# Patient Record
Sex: FEMALE | Race: AMERICAN INDIAN OR ALASKA NATIVE
[De-identification: names, ages, dates, MRNs, and addresses within clinical notes are randomized per-mention and may not be internally consistent; named-entity substitution may affect disease eponyms.]

---

## 2022-06-05 ENCOUNTER — HOSPITAL ENCOUNTER (EMERGENCY)
Dept: HOSPITAL 5 - ED | Age: 47
Discharge: HOME | End: 2022-06-05
Payer: COMMERCIAL

## 2022-06-05 VITALS — DIASTOLIC BLOOD PRESSURE: 94 MMHG | SYSTOLIC BLOOD PRESSURE: 148 MMHG

## 2022-06-05 DIAGNOSIS — Z98.890: ICD-10-CM

## 2022-06-05 DIAGNOSIS — M06.9: Primary | ICD-10-CM

## 2022-06-05 DIAGNOSIS — I10: ICD-10-CM

## 2022-06-05 LAB
ALBUMIN SERPL-MCNC: 4.4 G/DL (ref 3.9–5)
ALT SERPL-CCNC: 8 UNITS/L (ref 7–56)
BASOPHILS # (AUTO): 0 K/MM3 (ref 0–0.1)
BASOPHILS NFR BLD AUTO: 0.3 % (ref 0–1.8)
BUN SERPL-MCNC: 12 MG/DL (ref 7–17)
BUN/CREAT SERPL: 17 %
CALCIUM SERPL-MCNC: 9.3 MG/DL (ref 8.4–10.2)
EOSINOPHIL # BLD AUTO: 0.1 K/MM3 (ref 0–0.4)
EOSINOPHIL NFR BLD AUTO: 0.9 % (ref 0–4.3)
HCT VFR BLD CALC: 37.4 % (ref 30.3–42.9)
HEMOLYSIS INDEX: 5
HGB BLD-MCNC: 13.1 GM/DL (ref 10.1–14.3)
LYMPHOCYTES # BLD AUTO: 1.9 K/MM3 (ref 1.2–5.4)
LYMPHOCYTES NFR BLD AUTO: 18.8 % (ref 13.4–35)
MCHC RBC AUTO-ENTMCNC: 35 % (ref 30–34)
MCV RBC AUTO: 96 FL (ref 79–97)
MONOCYTES # (AUTO): 0.5 K/MM3 (ref 0–0.8)
MONOCYTES % (AUTO): 5.1 % (ref 0–7.3)
PLATELET # BLD: 347 K/MM3 (ref 140–440)
RBC # BLD AUTO: 3.89 M/MM3 (ref 3.65–5.03)

## 2022-06-05 PROCEDURE — 85025 COMPLETE CBC W/AUTO DIFF WBC: CPT

## 2022-06-05 PROCEDURE — 96361 HYDRATE IV INFUSION ADD-ON: CPT

## 2022-06-05 PROCEDURE — 80053 COMPREHEN METABOLIC PANEL: CPT

## 2022-06-05 PROCEDURE — 84703 CHORIONIC GONADOTROPIN ASSAY: CPT

## 2022-06-05 PROCEDURE — 96374 THER/PROPH/DIAG INJ IV PUSH: CPT

## 2022-06-05 PROCEDURE — 99283 EMERGENCY DEPT VISIT LOW MDM: CPT

## 2022-06-05 PROCEDURE — 36415 COLL VENOUS BLD VENIPUNCTURE: CPT

## 2022-06-05 PROCEDURE — 96375 TX/PRO/DX INJ NEW DRUG ADDON: CPT

## 2022-06-05 NOTE — EMERGENCY DEPARTMENT REPORT
ED General Adult HPI





- General


Chief complaint: Pain General


Stated complaint: HEADACHE/PAIN FROM RHEUMATOID ARTHRITIS


Time Seen by Provider: 06/05/22 14:54


Source: patient


Mode of arrival: Ambulatory


Limitations: No Limitations





- History of Present Illness


Initial comments: 





This is a 46-year-old female nontoxic, well nourished in appearance, no acute s

igns of distress presents to the ED with c/o of generalized joint aches times 

several days.  Patient stated has history of rheumatoid arthritis and last dose 

of prednisone was last month which helps her but the symptoms.  Patient 

otherwise denies any other symptoms or complaints.  Patient if this is typical 

RA flare up to her.  Patient denies any chest pain, shortness of breath, fever, 

chills, nausea, vomiting, headache, stiff neck, abdominal pain.  Patient denies 

any allergies.


-: days(s)


Radiation: non-radiation


Severity scale (0 -10): 8


Quality: aching


Consistency: constant


Improves with: none


Worsens with: none


Associated Symptoms: denies other symptoms.  denies: confusion, chest pain, c

ough, diaphoresis, fever/chills, headaches, loss of appetite, malaise, 

nausea/vomiting, rash, seizure, shortness of breath, syncope, weakness


Treatments Prior to Arrival: none





- Related Data


                                  Previous Rx's











 Medication  Instructions  Recorded  Last Taken  Type


 


tiZANidine [Zanaflex 4mg TAB] 4 mg PO BID #30 tablet 05/26/20 Unknown Rx


 


Naproxen 375 mg PO BID PRN #14 tablet 12/20/21 Unknown Rx


 


methOCARBAMOL [Robaxin TAB] 500 mg PO BID PRN #14 tab 12/20/21 Unknown Rx


 


Acetaminophen [Acetaminophen 8 650 mg PO Q8H PRN #12 tab 06/05/22 Unknown Rx





Hour]    


 


Prednisone [predniSONE 10 mg 10 mg PO .TAPER #1 06/05/22 Unknown Rx





(6-Day Pack, 21 Tabs)]    











                                    Allergies











Allergy/AdvReac Type Severity Reaction Status Date / Time


 


No Known Allergies Allergy   Unverified 05/26/20 08:11














ED Review of Systems


ROS: 


Stated complaint: HEADACHE/PAIN FROM RHEUMATOID ARTHRITIS


Other details as noted in HPI





Comment: All other systems reviewed and negative


Constitutional: denies: chills, fever


Eyes: denies: eye pain, eye discharge, vision change


ENT: denies: ear pain, throat pain


Respiratory: denies: cough, shortness of breath, wheezing


Cardiovascular: denies: chest pain, palpitations


Endocrine: no symptoms reported


Gastrointestinal: denies: abdominal pain, nausea, diarrhea


Genitourinary: denies: urgency, dysuria, discharge


Musculoskeletal: arthralgia.  denies: back pain, joint swelling, myalgia


Skin: denies: rash, lesions


Neurological: denies: headache, weakness, paresthesias


Psychiatric: denies: anxiety, depression


Hematological/Lymphatic: denies: easy bleeding, easy bruising





ED Past Medical Hx





- Past Medical History


Previous Medical History?: Yes


Hx Hypertension: Yes


Hx Arthritis: Yes (Rheumatoid)


Additional medical history: Rheumatoid arthritis





- Surgical History


Past Surgical History?: Yes


Additional Surgical History: gallbladder and 2 C-sections





- Social History


Smoking Status: Never Smoker





- Medications


Home Medications: 


                                Home Medications











 Medication  Instructions  Recorded  Confirmed  Last Taken  Type


 


tiZANidine [Zanaflex 4mg TAB] 4 mg PO BID #30 tablet 05/26/20  Unknown Rx


 


Naproxen 375 mg PO BID PRN #14 tablet 12/20/21  Unknown Rx


 


methOCARBAMOL [Robaxin TAB] 500 mg PO BID PRN #14 tab 12/20/21  Unknown Rx


 


Acetaminophen [Acetaminophen 8 650 mg PO Q8H PRN #12 tab 06/05/22  Unknown Rx





Hour]     


 


Prednisone [predniSONE 10 mg 10 mg PO .TAPER #1 06/05/22  Unknown Rx





(6-Day Pack, 21 Tabs)]     














ED Physical Exam





- General


Limitations: No Limitations


General appearance: alert, in no apparent distress





- Head


Head exam: Present: atraumatic, normocephalic





- Eye


Eye exam: Present: normal appearance, EOMI





- Neck


Neck exam: Present: normal inspection, full ROM.  Absent: tenderness, 

meningismus, lymphadenopathy





- Respiratory


Respiratory exam: Present: normal lung sounds bilaterally.  Absent: respiratory 

distress, wheezes, rales, rhonchi, stridor, chest wall tenderness, accessory 

muscle use, decreased breath sounds, prolonged expiratory





- Cardiovascular


Cardiovascular Exam: Present: regular rate, normal rhythm, normal heart sounds. 

Absent: bradycardia, tachycardia, irregular rhythm, systolic murmur, diastolic 

murmur, rubs, gallop





- GI/Abdominal


GI/Abdominal exam: Present: soft, normal bowel sounds.  Absent: distended, 

tenderness, guarding, rebound, rigid, diminished bowel sounds





- Extremities Exam


Extremities exam: Present: normal inspection, full ROM, normal capillary refill.

 Absent: tenderness, joint swelling





- Back Exam


Back exam: Present: normal inspection, full ROM.  Absent: tenderness, CVA 

tenderness (R), CVA tenderness (L), muscle spasm, paraspinal tenderness, 

vertebral tenderness, rash noted





- Neurological Exam


Neurological exam: Present: alert, oriented X3, normal gait





- Psychiatric


Psychiatric exam: Present: normal affect, normal mood





- Skin


Skin exam: Present: warm, dry, intact, normal color.  Absent: rash





ED Course





                                   Vital Signs











  06/05/22





  14:24


 


Temperature 98.2 F


 


Pulse Rate 68


 


Respiratory 20





Rate 


 


Blood Pressure 148/94





[Right] 


 


O2 Sat by Pulse 98





Oximetry 














- Reevaluation(s)


Reevaluation #1: 





06/05/22 17:49


Patient is speaking in full sentences with no signs of distress noted.





ED Medical Decision Making





- Lab Data


Result diagrams: 


                                 06/05/22 15:39





                                 06/05/22 15:39








                                   Lab Results











  06/05/22 06/05/22 06/05/22 Range/Units





  15:39 15:39 15:39 


 


WBC  10.0    (4.5-11.0)  K/mm3


 


RBC  3.89    (3.65-5.03)  M/mm3


 


Hgb  13.1    (10.1-14.3)  gm/dl


 


Hct  37.4    (30.3-42.9)  %


 


MCV  96    (79-97)  fl


 


MCH  34 H    (28-32)  pg


 


MCHC  35 H    (30-34)  %


 


RDW  12.7 L    (13.2-15.2)  %


 


Plt Count  347    (140-440)  K/mm3


 


Lymph % (Auto)  18.8    (13.4-35.0)  %


 


Mono % (Auto)  5.1    (0.0-7.3)  %


 


Eos % (Auto)  0.9    (0.0-4.3)  %


 


Baso % (Auto)  0.3    (0.0-1.8)  %


 


Lymph # (Auto)  1.9    (1.2-5.4)  K/mm3


 


Mono # (Auto)  0.5    (0.0-0.8)  K/mm3


 


Eos # (Auto)  0.1    (0.0-0.4)  K/mm3


 


Baso # (Auto)  0.0    (0.0-0.1)  K/mm3


 


Seg Neutrophils %  74.9 H    (40.0-70.0)  %


 


Seg Neutrophils #  7.5    (1.8-7.7)  K/mm3


 


Sodium   140   (137-145)  mmol/L


 


Potassium   3.8   (3.6-5.0)  mmol/L


 


Chloride   103.8   ()  mmol/L


 


Carbon Dioxide   22   (22-30)  mmol/L


 


Anion Gap   18   mmol/L


 


BUN   12   (7-17)  mg/dL


 


Creatinine   0.7   (0.6-1.2)  mg/dL


 


Estimated GFR   > 60   ml/min


 


BUN/Creatinine Ratio   17   %


 


Glucose   81   ()  mg/dL


 


Calcium   9.3   (8.4-10.2)  mg/dL


 


Total Bilirubin   0.40   (0.1-1.2)  mg/dL


 


AST   13   (5-40)  units/L


 


ALT   8   (7-56)  units/L


 


Alkaline Phosphatase   93   ()  units/L


 


Total Protein   7.7   (6.3-8.2)  g/dL


 


Albumin   4.4   (3.9-5)  g/dL


 


Albumin/Globulin Ratio   1.3   %


 


HCG, Qual    Negative  (Negative)  














- Medical Decision Making





46-year-old female that presents with RA flareup.  Patient is stable and was 

examined by me.  Patient received medical treatment in ER which she stated 

improved and subsided.  I will discharge patient on prednisone as well as 

Tylenol arthritis.  Patient's family member present at the bedside and stated 

will drive patient home after discharge due to possible drowsiness of morphine. 

 Patient was instructed to follow-up with a primary care doctor in 3-5 days or 

if symptoms worsen and continue return to emergency room as soon as possible.  

At time of discharge, the patient does not seem toxic or ill in appearance.  No 

acute signs of distress noted.  Patient agrees to discharge treatment plan of 

care.  No further questions noted by the patient.


Critical care attestation.: 


If time is entered above; I have spent that time in minutes in the direct care 

of this critically ill patient, excluding procedure time.








ED Disposition


Clinical Impression: 


 Rheumatoid arthritis flare





Disposition: 01 HOME / SELF CARE / HOMELESS


Is pt being admited?: No


Does the pt Need Aspirin: No


Condition: Stable


Additional Instructions: 


Follow-up with a primary care doctor in 3-5 days or if symptoms worsen and 

continue return to emergency room as soon as possible. 


Prescriptions: 


Acetaminophen [Acetaminophen 8 Hour] 650 mg PO Q8H PRN #12 tab


 PRN Reason: Pain , Severe (7-10)


Prednisone [predniSONE 10 mg (6-Day Pack, 21 Tabs)] 10 mg PO .TAPER #1


Referrals: 


PRIMARY CARE,MD [Primary Care Provider] - 3-5 Days


JAMES TABARES MD [Staff Physician] - 3-5 Days


Time of Disposition: 17:51